# Patient Record
Sex: FEMALE | Race: WHITE
[De-identification: names, ages, dates, MRNs, and addresses within clinical notes are randomized per-mention and may not be internally consistent; named-entity substitution may affect disease eponyms.]

---

## 2020-03-20 ENCOUNTER — HOSPITAL ENCOUNTER (EMERGENCY)
Dept: HOSPITAL 26 - MED | Age: 40
Discharge: HOME | End: 2020-03-20
Payer: MEDICAID

## 2020-03-20 VITALS — WEIGHT: 221 LBS | HEIGHT: 66 IN | BODY MASS INDEX: 35.52 KG/M2

## 2020-03-20 VITALS — DIASTOLIC BLOOD PRESSURE: 97 MMHG | SYSTOLIC BLOOD PRESSURE: 157 MMHG

## 2020-03-20 VITALS — SYSTOLIC BLOOD PRESSURE: 157 MMHG | DIASTOLIC BLOOD PRESSURE: 97 MMHG

## 2020-03-20 DIAGNOSIS — J06.9: Primary | ICD-10-CM

## 2020-03-20 DIAGNOSIS — R03.0: ICD-10-CM

## 2020-03-20 NOTE — NUR
38 Y/O FEMALE PRESENTS TO ER WITH COUGH X 6 DAYS.  PT STATES COUGH IS HACKING, 
NON-PRODUCTIVE. ALSO HAS HEADACHE, NAUSEA, SORE THROAT. DENIES BODY ACHES, 
VOMITING, DIARRHEA.  STATES SHE TOOK MUCINEX LAST NIGHT, AND ROBITUSSIN AROUND 
3 AM. DENIES LEAVING COUNTRY IN THE LAST 14 DAYS, OR BEING AROUND PERSON WITH 
ACTIVE CORONA VIRUS.  LMP X 5 DAYS AGO. R/R EQUAL, AND UNLABORED, SIDERAIL X1, 
WILL CONTINUE TO MONITOR



DENIES PMH

NKDA

## 2020-03-20 NOTE — NUR
Patient discharged with v/s stable. Written and verbal after care instructions 
given and explained. 

Patient alert, oriented and verbalized understanding of instructions. 
Ambulatory with steady gait. All questions addressed prior to discharge. ID 
band removed. Patient advised to follow up with PMD. Rx of FLONASE, 
PROMETHAZINE given. Patient educated on indication of medication including 
possible reaction and side effects. Opportunity to ask questions provided and 
answered.